# Patient Record
Sex: MALE | Race: WHITE | NOT HISPANIC OR LATINO | Employment: FULL TIME | URBAN - METROPOLITAN AREA
[De-identification: names, ages, dates, MRNs, and addresses within clinical notes are randomized per-mention and may not be internally consistent; named-entity substitution may affect disease eponyms.]

---

## 2018-01-30 ENCOUNTER — OFFICE VISIT (OUTPATIENT)
Dept: FAMILY MEDICINE | Facility: CLINIC | Age: 38
End: 2018-01-30
Payer: COMMERCIAL

## 2018-01-30 VITALS
HEIGHT: 70 IN | DIASTOLIC BLOOD PRESSURE: 86 MMHG | BODY MASS INDEX: 31.12 KG/M2 | WEIGHT: 217.4 LBS | OXYGEN SATURATION: 99 % | HEART RATE: 83 BPM | SYSTOLIC BLOOD PRESSURE: 128 MMHG | RESPIRATION RATE: 15 BRPM | TEMPERATURE: 97.8 F

## 2018-01-30 DIAGNOSIS — R11.10 VOMITING, INTRACTABILITY OF VOMITING NOT SPECIFIED, PRESENCE OF NAUSEA NOT SPECIFIED, UNSPECIFIED VOMITING TYPE: ICD-10-CM

## 2018-01-30 DIAGNOSIS — R68.83 CHILLS: Primary | ICD-10-CM

## 2018-01-30 DIAGNOSIS — R25.1 SHAKINESS: ICD-10-CM

## 2018-01-30 LAB
BASOPHILS # BLD AUTO: 0 10E9/L (ref 0–0.2)
BASOPHILS NFR BLD AUTO: 0.3 %
DIFFERENTIAL METHOD BLD: NORMAL
EOSINOPHIL # BLD AUTO: 0.1 10E9/L (ref 0–0.7)
EOSINOPHIL NFR BLD AUTO: 1 %
ERYTHROCYTE [DISTWIDTH] IN BLOOD BY AUTOMATED COUNT: 13.6 % (ref 10–15)
FLUAV+FLUBV AG SPEC QL: NEGATIVE
FLUAV+FLUBV AG SPEC QL: NEGATIVE
HCT VFR BLD AUTO: 47 % (ref 40–53)
HGB BLD-MCNC: 15.9 G/DL (ref 13.3–17.7)
LYMPHOCYTES # BLD AUTO: 2.3 10E9/L (ref 0.8–5.3)
LYMPHOCYTES NFR BLD AUTO: 33.7 %
MCH RBC QN AUTO: 28 PG (ref 26.5–33)
MCHC RBC AUTO-ENTMCNC: 33.8 G/DL (ref 31.5–36.5)
MCV RBC AUTO: 83 FL (ref 78–100)
MONOCYTES # BLD AUTO: 0.5 10E9/L (ref 0–1.3)
MONOCYTES NFR BLD AUTO: 6.9 %
NEUTROPHILS # BLD AUTO: 3.9 10E9/L (ref 1.6–8.3)
NEUTROPHILS NFR BLD AUTO: 58.1 %
PLATELET # BLD AUTO: 228 10E9/L (ref 150–450)
RBC # BLD AUTO: 5.68 10E12/L (ref 4.4–5.9)
SPECIMEN SOURCE: NORMAL
WBC # BLD AUTO: 6.8 10E9/L (ref 4–11)

## 2018-01-30 PROCEDURE — 36415 COLL VENOUS BLD VENIPUNCTURE: CPT | Performed by: PHYSICIAN ASSISTANT

## 2018-01-30 PROCEDURE — 99213 OFFICE O/P EST LOW 20 MIN: CPT | Performed by: PHYSICIAN ASSISTANT

## 2018-01-30 PROCEDURE — 80048 BASIC METABOLIC PNL TOTAL CA: CPT | Performed by: PHYSICIAN ASSISTANT

## 2018-01-30 PROCEDURE — 85025 COMPLETE CBC W/AUTO DIFF WBC: CPT | Performed by: PHYSICIAN ASSISTANT

## 2018-01-30 PROCEDURE — 87804 INFLUENZA ASSAY W/OPTIC: CPT | Performed by: PHYSICIAN ASSISTANT

## 2018-01-30 PROCEDURE — 84443 ASSAY THYROID STIM HORMONE: CPT | Performed by: PHYSICIAN ASSISTANT

## 2018-01-30 NOTE — LETTER
February 6, 2018      Donald Joyner  [address] [place] [address]        Hi Donald,    Attached are copies of your most recent visit.    The flu testing and CBC (looking at white and red blood cells) looked normal    Your thyroid was within the expected range.    The blood sugar was normal if not fasting. If you believe you were fasting for this lab (hadnt had anything to drink/eat for around 10 hours) then we should do some more testing.  Likely this is a normal result.     Please let us know and we'll order the appropriate labs, if needed. Hope youre feeling better.    Chris Adams

## 2018-01-30 NOTE — MR AVS SNAPSHOT
"              After Visit Summary   2018    Donald Joyner    MRN: [id number]           Patient Information     Date Of Birth          1980        Visit Information        Provider Department      2018 10:00 AM Fidel Adams PA-C [facility]        Today's Diagnoses     Chills    -  1    Shakiness        Vomiting, intractability of vomiting not specified, presence of nausea not specified, unspecified vomiting type           Follow-ups after your visit        Who to contact     If you have questions or need follow up information about today's clinic visit or your schedule please contact [facility] directly at [phone].  Normal or non-critical lab and imaging results will be communicated to you by MyChart, letter or phone within 4 business days after the clinic has received the results. If you do not hear from us within 7 days, please contact the clinic through MyChart or phone. If you have a critical or abnormal lab result, we will notify you by phone as soon as possible.  Submit refill requests through [employer] or call your pharmacy and they will forward the refill request to us. Please allow 3 business days for your refill to be completed.          Additional Information About Your Visit        MyChart Information     [employer] lets you send messages to your doctor, view your test results, renew your prescriptions, schedule appointments and more. To sign up, go to [email or web address] . Click on \"Log in\" on the left side of the screen, which will take you to the Welcome page. Then click on \"Sign up Now\" on the right side of the page.     You will be asked to enter the access code listed below, as well as some personal information. Please follow the directions to create your username and password.     Your access code is: [email or web address]  Expires: 2018 10:57 AM     Your access code will  in 90 days. If you need help or a new code, please call your " "[facility] or [phone].        Care EveryWhere ID     This is your Care EveryWhere ID. This could be used by other organizations to access your [place] medical records  [id number]        Your Vitals Were     Pulse Temperature Respirations Height Pulse Oximetry BMI (Body Mass Index)    83 97.8  F (36.6  C) (Tympanic) 15 5' 10.25\" (1.784 m) 99% 30.97 kg/m2       Blood Pressure from Last 3 Encounters:   01/30/18 128/86   12/29/16 124/84    Weight from Last 3 Encounters:   01/30/18 217 lb 6.4 oz (98.6 kg)   12/29/16 214 lb (97.1 kg)              We Performed the Following     Basic metabolic panel     CBC with platelets differential     Influenza A/B antigen     TSH with free T4 reflex        Primary Care Provider Fax #    Physician No Ref-Primary [phone]       No address on file        Equal Access to Services     [facility] : Hadii nubia Fierro, wamikael daley, qaybta kaalmada lupe, zena rod . So [facility] [phone].    ATENCIÓN: Si habla español, tiene a roche disposición servicios gratuitos de asistencia lingüística. Llame al [phone].    We comply with applicable federal civil rights laws and [place] laws. We do not discriminate on the basis of race, color, national origin, age, disability, sex, sexual orientation, or gender identity.            Thank you!     Thank you for choosing [facility]  for your care. Our goal is always to provide you with excellent care. Hearing back from our patients is one way we can continue to improve our services. Please take a few minutes to complete the written survey that you may receive in the mail after your visit with us. Thank you!             Your Updated Medication List - Protect others around you: Learn how to safely use, store and throw away your medicines at www.disposemymeds.org.      Notice  As of 1/30/2018 10:57 AM    You have not been prescribed any medications.      "

## 2018-01-30 NOTE — NURSING NOTE
"Chief Complaint   Patient presents with     Consult       Initial /86 (BP Location: Right arm, Patient Position: Chair, Cuff Size: Adult Regular)  Pulse 83  Temp 97.8  F (36.6  C) (Tympanic)  Resp 15  Ht 5' 10.25\" (1.784 m)  Wt 217 lb 6.4 oz (98.6 kg)  SpO2 99%  BMI 30.97 kg/m2 Estimated body mass index is 30.97 kg/(m^2) as calculated from the following:    Height as of this encounter: 5' 10.25\" (1.784 m).    Weight as of this encounter: 217 lb 6.4 oz (98.6 kg).  Medication Reconciliation: complete   Sania Javier CMA (AAMA)      "

## 2018-01-30 NOTE — PROGRESS NOTES
"  SUBJECTIVE:   Donald Joyner is a 37 year old male who presents to clinic today for the following health issues:    Patient states since Thursday he has been getting intermittent sweats and chills, shakiness, and dizziness.   Symptoms started after he got home from his daughter's concert  He associates this with sugars, because family members have hx of diabetes  Did not nauseated, but did end up vomiting Thursday evening  No diarrhea, no upset stomach since    Since the onset he's felt symptoms mostly in the am, and pm   -they are diminished during the day, but he is busy during these times  -Has not checked any fever/temps  -Whole world \"seems like a fog, but not foggy vision\"  -There is no cough any longer, there wsa cough at the onset    -No other family members with similar symptoms    -Denies severe headache but mild dull headache in evening    -eating and drinking like normal    -no chest pain, shortness of breath or palpitations    -Has NOT taken any medications for symptoms      Problem list and histories reviewed & adjusted, as indicated.  Additional history: as documented    Patient Active Problem List   Diagnosis     CARDIOVASCULAR SCREENING; LDL GOAL LESS THAN 160     Past Surgical History:   Procedure Laterality Date     NO HISTORY OF SURGERY         Social History   Substance Use Topics     Smoking status: Never Smoker     Smokeless tobacco: Never Used     Alcohol use Yes      Comment: occ     Family History   Problem Relation Age of Onset     DIABETES Mother      DIABETES Maternal Grandmother      DIABETES Maternal Grandfather      Prostate Cancer Maternal Grandfather      Asthma Maternal Grandfather            Reviewed and updated as needed this visit by clinical staff       Reviewed and updated as needed this visit by Provider         ROS:  Constitutional, HEENT, cardiovascular, pulmonary, gi and gu systems are negative, except as otherwise noted.    OBJECTIVE:     /86 (BP Location: Right " "arm, Patient Position: Chair, Cuff Size: Adult Regular)  Pulse 83  Temp 97.8  F (36.6  C) (Tympanic)  Resp 15  Ht 5' 10.25\" (1.784 m)  Wt 217 lb 6.4 oz (98.6 kg)  SpO2 99%  BMI 30.97 kg/m2  Body mass index is 30.97 kg/(m^2).  GENERAL: healthy, alert and no distress  EYES: Eyes grossly normal to inspection, PERRL and conjunctivae and sclerae normal  HENT: ear canals and TM's normal, nose and mouth without ulcers or lesions  NECK: no adenopathy and thyroid normal to palpation  RESP: lungs clear to auscultation - no rales, rhonchi or wheezes  CV: regular rates and rhythm, normal S1 S2, no S3 or S4 and no murmur, click or rub  ABDOMEN: soft, nontender, no hepatosplenomegaly, no masses and bowel sounds normal  NEURO: Normal strength and tone, mentation intact and speech normal  PSYCH: mentation appears normal, affect normal/bright    Diagnostic Test Results:  Results for orders placed or performed in visit on 01/30/18 (from the past 24 hour(s))   Influenza A/B antigen   Result Value Ref Range    Influenza A/B Agn Specimen Nasal     Influenza A Negative NEG^Negative    Influenza B Negative NEG^Negative   CBC with platelets differential   Result Value Ref Range    WBC 6.8 4.0 - 11.0 10e9/L    RBC Count 5.68 4.4 - 5.9 10e12/L    Hemoglobin 15.9 13.3 - 17.7 g/dL    Hematocrit 47.0 40.0 - 53.0 %    MCV 83 78 - 100 fl    MCH 28.0 26.5 - 33.0 pg    MCHC 33.8 31.5 - 36.5 g/dL    RDW 13.6 10.0 - 15.0 %    Platelet Count 228 150 - 450 10e9/L    Diff Method Automated Method     % Neutrophils 58.1 %    % Lymphocytes 33.7 %    % Monocytes 6.9 %    % Eosinophils 1.0 %    % Basophils 0.3 %    Absolute Neutrophil 3.9 1.6 - 8.3 10e9/L    Absolute Lymphocytes 2.3 0.8 - 5.3 10e9/L    Absolute Monocytes 0.5 0.0 - 1.3 10e9/L    Absolute Eosinophils 0.1 0.0 - 0.7 10e9/L    Absolute Basophils 0.0 0.0 - 0.2 10e9/L       ASSESSMENT/PLAN:   1. Chills  2. Shakiness  3. Vomiting, intractability of vomiting not specified, presence of nausea " not specified, unspecified vomiting type  Donald's flu and cbc testing today both looked excellent. I am dubious this is 2/2 diabetes but he is adamant and we will see what the sugars levels look like. I did run a thyroid. There are no cardiac symptoms and his neuro exam was normal but we'll need to look down those paths if symptoms dont improve. I think more likely this is a viral illness that will steadily improve. We'll see what the remaining labs look like and consider next steps depending on his symptoms.   - Influenza A/B antigen  - CBC with platelets differential  - TSH with free T4 reflex  - Basic metabolic panel    Fidel Adams PA-C  [facility]

## 2018-01-31 LAB
ANION GAP SERPL CALCULATED.3IONS-SCNC: 5 MMOL/L (ref 3–14)
BUN SERPL-MCNC: 19 MG/DL (ref 7–30)
CALCIUM SERPL-MCNC: 8.9 MG/DL (ref 8.5–10.1)
CHLORIDE SERPL-SCNC: 104 MMOL/L (ref 94–109)
CO2 SERPL-SCNC: 29 MMOL/L (ref 20–32)
CREAT SERPL-MCNC: 1.04 MG/DL (ref 0.66–1.25)
GFR SERPL CREATININE-BSD FRML MDRD: 81 ML/MIN/1.7M2
GLUCOSE SERPL-MCNC: 131 MG/DL (ref 70–99)
POTASSIUM SERPL-SCNC: 4.1 MMOL/L (ref 3.4–5.3)
SODIUM SERPL-SCNC: 138 MMOL/L (ref 133–144)
TSH SERPL DL<=0.005 MIU/L-ACNC: 1.7 MU/L (ref 0.4–4)

## 2021-05-12 ENCOUNTER — OFFICE VISIT (OUTPATIENT)
Dept: FAMILY MEDICINE | Facility: CLINIC | Age: 41
End: 2021-05-12
Payer: COMMERCIAL

## 2021-05-12 VITALS
TEMPERATURE: 97.8 F | DIASTOLIC BLOOD PRESSURE: 80 MMHG | HEIGHT: 70 IN | BODY MASS INDEX: 31.24 KG/M2 | WEIGHT: 218.2 LBS | HEART RATE: 90 BPM | RESPIRATION RATE: 16 BRPM | SYSTOLIC BLOOD PRESSURE: 120 MMHG | OXYGEN SATURATION: 99 %

## 2021-05-12 DIAGNOSIS — M79.671 RIGHT FOOT PAIN: ICD-10-CM

## 2021-05-12 DIAGNOSIS — H66.001 NON-RECURRENT ACUTE SUPPURATIVE OTITIS MEDIA OF RIGHT EAR WITHOUT SPONTANEOUS RUPTURE OF TYMPANIC MEMBRANE: Primary | ICD-10-CM

## 2021-05-12 DIAGNOSIS — H61.23 BILATERAL IMPACTED CERUMEN: ICD-10-CM

## 2021-05-12 PROCEDURE — 69209 REMOVE IMPACTED EAR WAX UNI: CPT | Mod: 50 | Performed by: PHYSICIAN ASSISTANT

## 2021-05-12 PROCEDURE — 99203 OFFICE O/P NEW LOW 30 MIN: CPT | Mod: 25 | Performed by: PHYSICIAN ASSISTANT

## 2021-05-12 RX ORDER — AMOXICILLIN 875 MG
875 TABLET ORAL 2 TIMES DAILY
Qty: 14 TABLET | Refills: 0 | Status: SHIPPED | OUTPATIENT
Start: 2021-05-12 | End: 2021-05-19

## 2021-05-12 ASSESSMENT — PAIN SCALES - GENERAL: PAINLEVEL: MODERATE PAIN (4)

## 2021-05-12 ASSESSMENT — MIFFLIN-ST. JEOR: SCORE: 1914.97

## 2021-05-12 NOTE — PROGRESS NOTES
Assessment & Plan     Non-recurrent acute suppurative otitis media of right ear without spontaneous rupture of tympanic membrane  Treat with amoxicillin. Monitor and follow-up if worsening or no improvement.  - amoxicillin (AMOXIL) 875 MG tablet; Take 1 tablet (875 mg) by mouth 2 times daily for 7 days    Bilateral impacted cerumen  Cerumen removed without complication.  - REMOVE IMPACTED CERUMEN    Right foot pain  Suspect possible achilles tendinopathy. Tendon function intact. Recommend rest, ibuprofen, ice. Follow-up if worsening or no improvement.    Risks and benefits of treatment plan discussed. Patient and/or parent acknowledges and agrees with plan of care, all questions answered.        Return in about 3 months (around 8/12/2021) for Preventive Physical Exam.    TORIN Navarro [facility] SHYAM Bennett is a 41 year old who presents for the following health issues    HPI     Concern - Right ear pain  Onset: 1 week ago  Description: Feels clogged, diminished hearing. Hurts when pushing on ear. Worse at the day progresses  Intensity: moderate  Progression of Symptoms:  Worsening for the past 2 days  Accompanying Signs & Symptoms: none  Previous history of similar problem: ear infection 10 years ago  Precipitating factors:        Worsened by: none  Alleviating factors:        Improved by: Tylenol  Therapies tried and outcome: Helped a bit.    Woke up about 1 week ago and noticed right ear felt clogged. No real pain. Was improving but then a few days ago got worse. No ear drainage. Ear feels muffled. No fever or recent illness. No nasal congestion but has been noticing post nasal drainage. No recent air travel or swimming. Has taken tylenol with some mild improvement.    Musculoskeletal problem/pain  Onset/Duration: Last night about 9pm. Not sure what he did  Description  Location: right heel - right  Joint Swelling: no  Redness: no  Pain: YES- Standing and bearing  "weight and walking  Warmth: no  Intensity:  moderate to severe especially when walking  Progression of Symptoms:  same  Accompanying signs and symptoms:   Fevers: no  Numbness/tingling/weakness: no  History  Trauma to the area: May have turned wrong and started to pain  Recent illness:  no  Previous similar problem: no  Previous evaluation:  no  Precipitating or alleviating factors:  Aggravating factors include: walking, standing, going up and down the stairs at work  Therapies tried and outcome: tylenol and crutches    Yesterday did a lot of walking up and down stairs at work. Last night was standing and moved his right foot, had sudden pain in posterior lower leg/achilles tendon region. Since then, pain has been persistent, worse with weight bearing. No injury or trauma. No swelling, bruising, or deformity. No history of problems with the foot or ankle. No numbness/tingling, weakness. He had crutches at home which have been helping him walk. He is able to weight bear, just with increased pain. Tylenol helped a little.    Review of Systems   Constitutional, HEENT, cardiovascular, pulmonary, gi and gu systems are negative, except as otherwise noted.      Objective    /80 (BP Location: Right arm, Patient Position: Sitting, Cuff Size: Adult Large)   Pulse 90   Temp 97.8  F (36.6  C) (Oral)   Resp 16   Ht 1.784 m (5' 10.25\")   Wt 99 kg (218 lb 3.2 oz)   SpO2 99%   BMI 31.09 kg/m    Body mass index is 31.09 kg/m .  Physical Exam   GENERAL: healthy, alert and no distress  HENT: normal cephalic/atraumatic, right ear: occluded with wax; after cerumen removal, TM is erythematous, bulging membrane, mucopurulent effusion and left ear: occluded with wax, normal after cerumen removal: no effusions, no erythema, normal landmarks, nose and mouth without ulcers or lesions, oropharynx clear and oral mucous membranes moist  NECK: no adenopathy, no asymmetry, masses, or scars and thyroid normal to palpation  RESP: lungs " clear to auscultation - no rales, rhonchi or wheezes  CV: regular rate and rhythm, normal S1 S2, no S3 or S4, no murmur, click or rub, no peripheral edema and peripheral pulses strong  MS: no gross musculoskeletal defects noted, no edema. Right ankle and foot without bony tenderness, full ROM of foot and ankle, PT and DP pulses intact, cap refill <2 seconds, mild tenderness over achilles, achilles tendon function intact, zhong test negative  NEURO: Normal strength and tone, mentation intact and speech normal  PSYCH: mentation appears normal, affect normal/bright

## 2021-05-12 NOTE — NURSING NOTE
Patient identified using two patient identifiers.  Ear exam showing wax occlusion completed by provider.  Solution: warm water was placed in the bilateral ear(s) via irrigation tool: elephant ear.    Kandice Huang, JAYME

## 2021-05-14 ENCOUNTER — MYC MEDICAL ADVICE (OUTPATIENT)
Dept: FAMILY MEDICINE | Facility: CLINIC | Age: 41
End: 2021-05-14

## 2021-06-19 ENCOUNTER — HEALTH MAINTENANCE LETTER (OUTPATIENT)
Age: 41
End: 2021-06-19

## 2021-10-09 ENCOUNTER — HEALTH MAINTENANCE LETTER (OUTPATIENT)
Age: 41
End: 2021-10-09

## 2022-07-10 ENCOUNTER — HEALTH MAINTENANCE LETTER (OUTPATIENT)
Age: 42
End: 2022-07-10

## 2022-09-11 ENCOUNTER — HEALTH MAINTENANCE LETTER (OUTPATIENT)
Age: 42
End: 2022-09-11

## 2023-02-13 ENCOUNTER — APPOINTMENT (OUTPATIENT)
Dept: GENERAL RADIOLOGY | Facility: CLINIC | Age: 43
End: 2023-02-13
Attending: EMERGENCY MEDICINE
Payer: COMMERCIAL

## 2023-02-13 ENCOUNTER — HOSPITAL ENCOUNTER (EMERGENCY)
Facility: CLINIC | Age: 43
Discharge: HOME | End: 2023-02-13
Attending: EMERGENCY MEDICINE | Admitting: EMERGENCY MEDICINE
Payer: COMMERCIAL

## 2023-02-13 VITALS
OXYGEN SATURATION: 99 % | HEART RATE: 90 BPM | SYSTOLIC BLOOD PRESSURE: 148 MMHG | RESPIRATION RATE: 18 BRPM | TEMPERATURE: 98.1 F | DIASTOLIC BLOOD PRESSURE: 99 MMHG

## 2023-02-13 DIAGNOSIS — R07.9 CHEST PAIN, UNSPECIFIED TYPE: ICD-10-CM

## 2023-02-13 DIAGNOSIS — R05.9 COUGH, UNSPECIFIED TYPE: ICD-10-CM

## 2023-02-13 LAB
ANION GAP SERPL CALCULATED.3IONS-SCNC: 12 MMOL/L (ref 7–15)
BASOPHILS # BLD AUTO: 0 10E3/UL (ref 0–0.2)
BASOPHILS NFR BLD AUTO: 0 %
BUN SERPL-MCNC: 15.8 MG/DL (ref 6–20)
CALCIUM SERPL-MCNC: 9.3 MG/DL (ref 8.6–10)
CHLORIDE SERPL-SCNC: 104 MMOL/L (ref 98–107)
CREAT SERPL-MCNC: 1.05 MG/DL (ref 0.67–1.17)
DEPRECATED HCO3 PLAS-SCNC: 27 MMOL/L (ref 22–29)
EOSINOPHIL # BLD AUTO: 0.1 10E3/UL (ref 0–0.7)
EOSINOPHIL NFR BLD AUTO: 1 %
ERYTHROCYTE [DISTWIDTH] IN BLOOD BY AUTOMATED COUNT: 13.8 % (ref 10–15)
FLUAV RNA SPEC QL NAA+PROBE: NEGATIVE
FLUBV RNA RESP QL NAA+PROBE: NEGATIVE
GFR SERPL CREATININE-BSD FRML MDRD: >90 ML/MIN/1.73M2
GLUCOSE SERPL-MCNC: 106 MG/DL (ref 70–99)
HCT VFR BLD AUTO: 46.3 % (ref 40–53)
HGB BLD-MCNC: 15 G/DL (ref 13.3–17.7)
IMM GRANULOCYTES # BLD: 0 10E3/UL
IMM GRANULOCYTES NFR BLD: 0 %
LYMPHOCYTES # BLD AUTO: 1.5 10E3/UL (ref 0.8–5.3)
LYMPHOCYTES NFR BLD AUTO: 19 %
MCH RBC QN AUTO: 27.3 PG (ref 26.5–33)
MCHC RBC AUTO-ENTMCNC: 32.4 G/DL (ref 31.5–36.5)
MCV RBC AUTO: 84 FL (ref 78–100)
MONOCYTES # BLD AUTO: 0.5 10E3/UL (ref 0–1.3)
MONOCYTES NFR BLD AUTO: 6 %
NEUTROPHILS # BLD AUTO: 6.2 10E3/UL (ref 1.6–8.3)
NEUTROPHILS NFR BLD AUTO: 74 %
NRBC # BLD AUTO: 0 10E3/UL
NRBC BLD AUTO-RTO: 0 /100
PLATELET # BLD AUTO: 249 10E3/UL (ref 150–450)
POTASSIUM SERPL-SCNC: 4.2 MMOL/L (ref 3.4–5.3)
RBC # BLD AUTO: 5.5 10E6/UL (ref 4.4–5.9)
RSV RNA SPEC NAA+PROBE: NEGATIVE
SARS-COV-2 RNA RESP QL NAA+PROBE: NEGATIVE
SODIUM SERPL-SCNC: 143 MMOL/L (ref 136–145)
TROPONIN T SERPL HS-MCNC: <6 NG/L
WBC # BLD AUTO: 8.3 10E3/UL (ref 4–11)

## 2023-02-13 PROCEDURE — 84484 ASSAY OF TROPONIN QUANT: CPT | Performed by: EMERGENCY MEDICINE

## 2023-02-13 PROCEDURE — 85025 COMPLETE CBC W/AUTO DIFF WBC: CPT | Performed by: EMERGENCY MEDICINE

## 2023-02-13 PROCEDURE — 80048 BASIC METABOLIC PNL TOTAL CA: CPT | Performed by: EMERGENCY MEDICINE

## 2023-02-13 PROCEDURE — 87637 SARSCOV2&INF A&B&RSV AMP PRB: CPT | Performed by: EMERGENCY MEDICINE

## 2023-02-13 PROCEDURE — 71046 X-RAY EXAM CHEST 2 VIEWS: CPT

## 2023-02-13 PROCEDURE — 99285 EMERGENCY DEPT VISIT HI MDM: CPT | Mod: CS,25

## 2023-02-13 PROCEDURE — C9803 HOPD COVID-19 SPEC COLLECT: HCPCS

## 2023-02-13 PROCEDURE — 36415 COLL VENOUS BLD VENIPUNCTURE: CPT | Performed by: EMERGENCY MEDICINE

## 2023-02-13 PROCEDURE — 93005 ELECTROCARDIOGRAM TRACING: CPT

## 2023-02-13 RX ORDER — ERYTHROMYCIN 5 MG/G
0.5 OINTMENT OPHTHALMIC 3 TIMES DAILY
Qty: 4 G | Refills: 0 | Status: SHIPPED | OUTPATIENT
Start: 2023-02-13

## 2023-02-13 ASSESSMENT — ENCOUNTER SYMPTOMS
EYE DISCHARGE: 1
COUGH: 1
ABDOMINAL PAIN: 1
WEAKNESS: 1
LIGHT-HEADEDNESS: 1
SHORTNESS OF BREATH: 0
ARTHRALGIAS: 1

## 2023-02-13 ASSESSMENT — ACTIVITIES OF DAILY LIVING (ADL): ADLS_ACUITY_SCORE: 35

## 2023-02-13 NOTE — ED TRIAGE NOTES
Patient reports cold symptoms for 1 week, with a productive cough, and runny nose. Patient states last night he developed chest pain he rates a 2/10, along with weakness and fatigue.    Triage Assessment     Row Name 02/13/23 0821       Triage Assessment (Adult)    Airway WDL WDL       Respiratory WDL    Respiratory WDL WDL       Skin Circulation/Temperature WDL    Skin Circulation/Temperature WDL WDL       Cardiac WDL    Cardiac WDL WDL       Peripheral/Neurovascular WDL    Peripheral Neurovascular WDL WDL       Cognitive/Neuro/Behavioral WDL    Cognitive/Neuro/Behavioral WDL WDL

## 2023-02-13 NOTE — ED PROVIDER NOTES
"  History     Chief Complaint:  Cough       HPI   Donald Joyner is a 42 year old male who presents with cough and congestion for the past week, along with intermittent LUQ abdominal pain, left shoulder pain, and left upper arm pain for the past 2 days. He reports generalized weakness and intermittent lightheadedness. He also notes \"mucousy\" eye discharge, and a \"stuffy\" left ear. He states the pain sometimes feels like his \"heart is beating weird.\" Denies shortness of breath. No ear pain.     Independent Historian:   None - Patient Only    Review of External Notes: reviewed 5/12/21 family medicine clinic visit    ROS:  Review of Systems   HENT: Positive for congestion. Negative for ear pain.    Eyes: Positive for discharge.   Respiratory: Positive for cough. Negative for shortness of breath.    Gastrointestinal: Positive for abdominal pain.   Musculoskeletal: Positive for arthralgias (left shoulder and arm).   Neurological: Positive for weakness and light-headedness.   All other systems reviewed and are negative.    Allergies:  No Known Allergies     Medications:    The patient is currently on no regular medications.     Past Medical History:    Patient denies past medical history.      Family History:    Mother - diabetes mellitus, Meniere's disease    Social History:  The patient presents to the ED with his wife via private vehicle.   PCP: No Ref-Primary, Physician     Physical Exam     Patient Vitals for the past 24 hrs:   BP Temp Temp src Pulse Resp SpO2   02/13/23 0822 (!) 148/99 98.1  F (36.7  C) Oral 90 18 99 %        Physical Exam  Constitutional: Alert, attentive  HENT:    Nose: Nose normal.    Mouth/Throat: Oropharynx is clear, mucous membranes are moist   Eyes: EOM are normal. Slight injection to the conjunctiva bilaterally, no discharge or scleral injection, no hyphema  CV: regular rate and rhythm; no murmurs, rubs or gallups  Chest: Effort normal and breath sounds normal.   GI:  There is no " tenderness. No distension. Normal bowel sounds  MSK: Normal range of motion.   Neurological: Alert, attentive  Skin: Skin is warm and dry.      Emergency Department Course   ECG  ECG taken at 0942, ECG read at 0945  Normal sinus rhythm with sinus arrhythmia. Normal ECG.   Rate 66 bpm. IA interval 162 ms. QRS duration 106 ms. QT/QTc 390/408 ms. P-R-T axes 59 -8 46.     Imaging:  XR Chest 2 Views   Preliminary Result   IMPRESSION: No acute cardiopulmonary disease.         Report per radiology    Laboratory:  Labs Ordered and Resulted from Time of ED Arrival to Time of ED Departure   INFLUENZA A/B & SARS-COV2 PCR MULTIPLEX - Normal       Result Value    Influenza A PCR Negative      Influenza B PCR Negative      RSV PCR Negative      SARS CoV2 PCR Negative     BASIC METABOLIC PANEL   TROPONIN T, HIGH SENSITIVITY      Emergency Department Course & Assessments:  ED Course as of 02/13/23 1210   Mon Feb 13, 2023   0932 I obtained the history and examined the patient as noted above.    1036 I rechecked and updated the patient.    1151 I rechecked and updated the patient.           Interventions:  Medications - No data to display     Independent Interpretation (X-rays, CTs, rhythm strip):  Imaging independently reviewed and agree with radiologist interpretation.           Disposition:  The patient was discharged to home.     Impression & Plan    Medical Decision Making:  This is a pleasant 42-year-old male presents for evaluation of atypical chest pain and left arm pain in the context of recent URI symptoms.  Given longstanding and atypical symptoms, is negative EKG and troponin since without AMI.  He is PERC negative, since ruling out PE.  Chest x-ray shows no widened mediastinum, pneumothorax or pneumonia.  Emphasized the unclear nature of his symptoms.  Considered admission for serial enzymes and provocative stress testing, but his HEART score makes outpatient management appropriate.  Given improving cough, doubt  bacterial bronchitis and there is no indication for antibiotics.  He does note some conjunctival injection which a validated on exam.  No signs or symptoms of keratitis or more concerning ophthalmologic process.  Will prescribe erythromycin ointment in case this represents a viral conjunctivitis.  Primary care follow-up in 1 week, stress echocardiogram to be scheduled in the next 72 hours, return precautions for worse pain, shortness of breath, or any other concerns.    Diagnosis:    ICD-10-CM    1. Chest pain, unspecified type  R07.9 Echo Stress Echocardiogram      2. Cough, unspecified type  R05.9             Scribe Disclosure:  Tiffany YANEZ, am serving as a scribe at 10:06 AM on 2/13/2023 to document services personally performed by Julio Webster MD based on my observations and the provider's statements to me.     2/13/2023   Julio Webster MD [place]ough[place], John Eric, MD  02/13/23 [id number]

## 2023-02-14 LAB
ATRIAL RATE - MUSE: 66 BPM
DIASTOLIC BLOOD PRESSURE - MUSE: NORMAL MMHG
INTERPRETATION ECG - MUSE: NORMAL
P AXIS - MUSE: 59 DEGREES
PR INTERVAL - MUSE: 162 MS
QRS DURATION - MUSE: 106 MS
QT - MUSE: 390 MS
QTC - MUSE: 408 MS
R AXIS - MUSE: -8 DEGREES
SYSTOLIC BLOOD PRESSURE - MUSE: NORMAL MMHG
T AXIS - MUSE: 46 DEGREES
VENTRICULAR RATE- MUSE: 66 BPM

## 2023-02-16 ENCOUNTER — HOSPITAL ENCOUNTER (OUTPATIENT)
Dept: CARDIOLOGY | Facility: CLINIC | Age: 43
Discharge: HOME | End: 2023-02-16
Attending: EMERGENCY MEDICINE | Admitting: EMERGENCY MEDICINE
Payer: COMMERCIAL

## 2023-02-16 DIAGNOSIS — R07.9 CHEST PAIN, UNSPECIFIED TYPE: ICD-10-CM

## 2023-02-16 PROCEDURE — 93321 DOPPLER ECHO F-UP/LMTD STD: CPT | Mod: 26 | Performed by: INTERNAL MEDICINE

## 2023-02-16 PROCEDURE — 93325 DOPPLER ECHO COLOR FLOW MAPG: CPT | Mod: TC

## 2023-02-16 PROCEDURE — 93350 STRESS TTE ONLY: CPT | Mod: TC

## 2023-02-16 PROCEDURE — 93350 STRESS TTE ONLY: CPT | Mod: 26 | Performed by: INTERNAL MEDICINE

## 2023-02-16 PROCEDURE — 93016 CV STRESS TEST SUPVJ ONLY: CPT | Performed by: INTERNAL MEDICINE

## 2023-02-16 PROCEDURE — 93325 DOPPLER ECHO COLOR FLOW MAPG: CPT | Mod: 26 | Performed by: INTERNAL MEDICINE

## 2023-02-16 PROCEDURE — 93018 CV STRESS TEST I&R ONLY: CPT | Performed by: INTERNAL MEDICINE

## 2023-07-29 ENCOUNTER — HEALTH MAINTENANCE LETTER (OUTPATIENT)
Age: 43
End: 2023-07-29

## 2024-09-21 ENCOUNTER — HEALTH MAINTENANCE LETTER (OUTPATIENT)
Age: 44
End: 2024-09-21